# Patient Record
Sex: FEMALE | Race: WHITE | NOT HISPANIC OR LATINO
[De-identification: names, ages, dates, MRNs, and addresses within clinical notes are randomized per-mention and may not be internally consistent; named-entity substitution may affect disease eponyms.]

---

## 2018-05-21 ENCOUNTER — TRANSCRIPTION ENCOUNTER (OUTPATIENT)
Age: 53
End: 2018-05-21

## 2021-05-03 DIAGNOSIS — L23.7 ALLERGIC CONTACT DERMATITIS DUE TO PLANTS, EXCEPT FOOD: ICD-10-CM

## 2021-05-03 DIAGNOSIS — Z86.69 PERSONAL HISTORY OF OTHER DISEASES OF THE NERVOUS SYSTEM AND SENSE ORGANS: ICD-10-CM

## 2021-05-03 DIAGNOSIS — G43.909 MIGRAINE, UNSPECIFIED, NOT INTRACTABLE, W/OUT STATUS MIGRAINOSUS: ICD-10-CM

## 2021-05-03 DIAGNOSIS — Z86.79 PERSONAL HISTORY OF OTHER DISEASES OF THE CIRCULATORY SYSTEM: ICD-10-CM
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
## 2022-11-10 DIAGNOSIS — S43.012D: ICD-10-CM

## 2022-11-10 DIAGNOSIS — Z87.891 PERSONAL HISTORY OF NICOTINE DEPENDENCE: ICD-10-CM

## 2022-11-10 DIAGNOSIS — M25.511 PAIN IN RIGHT SHOULDER: ICD-10-CM

## 2022-11-10 DIAGNOSIS — M75.51 BURSITIS OF RIGHT SHOULDER: ICD-10-CM

## 2022-11-10 RX ORDER — LISINOPRIL 20 MG/1
20 TABLET ORAL DAILY
Refills: 0 | Status: ACTIVE | COMMUNITY

## 2022-11-10 RX ORDER — ELETRIPTAN HYDROBROMIDE 40 MG/1
40 TABLET, FILM COATED ORAL
Refills: 0 | Status: ACTIVE | COMMUNITY

## 2022-11-10 RX ORDER — OXYCODONE HYDROCHLORIDE AND ACETAMINOPHEN 7.5; 325 MG/1; MG/1
7.5-325 TABLET ORAL
Refills: 0 | Status: ACTIVE | COMMUNITY

## 2022-11-10 RX ORDER — MELOXICAM 7.5 MG/1
7.5 TABLET ORAL DAILY
Refills: 0 | Status: ACTIVE | COMMUNITY

## 2022-11-10 RX ORDER — DIVALPROEX SODIUM 500 MG/1
500 TABLET, EXTENDED RELEASE ORAL DAILY
Refills: 0 | Status: ACTIVE | COMMUNITY

## 2022-11-10 RX ORDER — TOPIRAMATE 200 MG/1
200 TABLET, COATED ORAL DAILY
Refills: 0 | Status: ACTIVE | COMMUNITY

## 2022-11-10 RX ORDER — OXYCODONE 9 MG/1
9 CAPSULE, EXTENDED RELEASE ORAL
Refills: 0 | Status: ACTIVE | COMMUNITY

## 2022-11-10 RX ORDER — PROPRANOLOL HYDROCHLORIDE 120 MG/1
120 CAPSULE, EXTENDED RELEASE ORAL DAILY
Refills: 0 | Status: ACTIVE | COMMUNITY

## 2023-01-09 ENCOUNTER — APPOINTMENT (OUTPATIENT)
Dept: ORTHOPEDIC SURGERY | Facility: CLINIC | Age: 58
End: 2023-01-09

## 2023-03-06 ENCOUNTER — APPOINTMENT (OUTPATIENT)
Dept: ORTHOPEDIC SURGERY | Facility: CLINIC | Age: 58
End: 2023-03-06
Payer: OTHER MISCELLANEOUS

## 2023-03-06 VITALS — BODY MASS INDEX: 24.45 KG/M2 | HEIGHT: 64.5 IN | WEIGHT: 145 LBS

## 2023-03-06 PROCEDURE — 99072 ADDL SUPL MATRL&STAF TM PHE: CPT

## 2023-03-06 PROCEDURE — 20610 DRAIN/INJ JOINT/BURSA W/O US: CPT | Mod: RT

## 2023-03-06 PROCEDURE — 99213 OFFICE O/P EST LOW 20 MIN: CPT | Mod: 25

## 2023-03-06 PROCEDURE — J3490M: CUSTOM | Mod: RT

## 2023-03-06 NOTE — HISTORY OF PRESENT ILLNESS
[Work related] : work related [6] : 6 [4] : 4 [Sharp] : sharp [Intermittent] : intermittent [Household chores] : household chores [Leisure] : leisure [Nothing helps with pain getting better] : Nothing helps with pain getting better [Standing] : standing [Walking] : walking [Not working due to injury] : Work status: not working due to injury [] : no [FreeTextEntry1] : right shoulder  [FreeTextEntry5] : Patient is here to follow up on right shoulder. CSI from 10/10/22 gave relief. Pain has not improved since last visit. States pain is still worse with lifting arm above head or prolonged rest. Takes Tzanidine when needed.

## 2023-03-06 NOTE — IMAGING
[de-identified] : Examination of the right upper extremity reveals normal neurovascular exam.  Examination of the right shoulder reveals limited active forward elevation to 140 degrees external rotation of 50 degrees internal rotation to T12.  There is 5 out of 5 strength of deltoid and rotator cuff.  She has markedly positive Neer and Crook impingement signs.  She has no instability or apprehension.  She has well-healed scars.

## 2023-06-05 ENCOUNTER — APPOINTMENT (OUTPATIENT)
Dept: ORTHOPEDIC SURGERY | Facility: CLINIC | Age: 58
End: 2023-06-05
Payer: OTHER MISCELLANEOUS

## 2023-06-05 ENCOUNTER — RESULT CHARGE (OUTPATIENT)
Age: 58
End: 2023-06-05

## 2023-06-05 VITALS — HEIGHT: 64.5 IN | BODY MASS INDEX: 23.78 KG/M2 | WEIGHT: 141 LBS

## 2023-06-05 PROCEDURE — 73030 X-RAY EXAM OF SHOULDER: CPT | Mod: RT

## 2023-06-05 PROCEDURE — 73010 X-RAY EXAM OF SHOULDER BLADE: CPT | Mod: RT

## 2023-06-05 PROCEDURE — 99213 OFFICE O/P EST LOW 20 MIN: CPT | Mod: 25

## 2023-06-05 NOTE — HISTORY OF PRESENT ILLNESS
[Work related] : work related [Sharp] : sharp [Intermittent] : intermittent [Household chores] : household chores [Leisure] : leisure [Nothing helps with pain getting better] : Nothing helps with pain getting better [Standing] : standing [Walking] : walking [Not working due to injury] : Work status: not working due to injury [4] : 4 [3] : 3 [] : no [FreeTextEntry1] : right shoulder  [FreeTextEntry5] : 58 y/o F presents for WCFU eval. of Rt. Shldr. Pt reports pain levels have improved since last visit. Previous tx CSI 3/6/23 with good relief. Occasional radiating pain into neck. [FreeTextEntry7] : neck [de-identified] : 3/6/23 [de-identified] : Dr. Simon [de-identified] : CSI

## 2023-06-05 NOTE — ASSESSMENT
Patient armband removed and shredded  Patient confirmed by two identifiers with discharge instructions prior to being provided to patient and partner, Onelia Farris. [FreeTextEntry1] : Patient comes in today for follow-up on her right shoulder.  She continues have pain and stiffness reaching overhead straight ahead and behind. The patient has had a recent increase in pain without new trauma. She has pain with lifting and repetitive motions. She is not working at this time. She is RHD. \par \par Right shoulder exam: Neurovascularly intact. Sensation intact.  Examination of the right shoulder reveals limited active forward elevation to 140 degrees external rotation of 50 degrees internal rotation to T12.  There is 5 out of 5 strength of deltoid and rotator cuff.  She has markedly positive Neer and Crook impingement signs.  She has no instability or apprehension.  She has well-healed scars.\par \par X-rays done in the office today of the right shoulder 5 views including a weighted view shows no evidence of arthritis loose bodies tumors masses or calcification.  There is no proximal migration.  Normal study.\par \par Under sterile conditions the right shoulder was injected in the subacromial space with 5 cc quarter percent plain Marcaine 5 cc of 2% plain lidocaine and 40 mg of Kenalog.  Patient tolerated the procedure well.\par \par Plan at this point is activities as tolerated.  She is 100% temporary disability to the right shoulder.  Right shoulder pain is causally related to her work accident.  She is currently not working.  I will see her back in the office in 3 months as needed.

## 2023-06-19 ENCOUNTER — TRANSCRIPTION ENCOUNTER (OUTPATIENT)
Age: 58
End: 2023-06-19

## 2023-09-11 ENCOUNTER — APPOINTMENT (OUTPATIENT)
Dept: ORTHOPEDIC SURGERY | Facility: CLINIC | Age: 58
End: 2023-09-11
Payer: OTHER MISCELLANEOUS

## 2023-09-11 VITALS — WEIGHT: 140 LBS | BODY MASS INDEX: 23.9 KG/M2 | HEIGHT: 64 IN

## 2023-09-11 PROCEDURE — 99213 OFFICE O/P EST LOW 20 MIN: CPT | Mod: 25

## 2023-09-11 PROCEDURE — 20610 DRAIN/INJ JOINT/BURSA W/O US: CPT | Mod: RT

## 2023-09-11 PROCEDURE — J3490M: CUSTOM

## 2023-12-22 ENCOUNTER — APPOINTMENT (OUTPATIENT)
Dept: ORTHOPEDIC SURGERY | Facility: CLINIC | Age: 58
End: 2023-12-22
Payer: OTHER MISCELLANEOUS

## 2023-12-22 VITALS — WEIGHT: 140 LBS | HEIGHT: 64 IN | BODY MASS INDEX: 23.9 KG/M2

## 2023-12-22 PROCEDURE — J3490M: CUSTOM

## 2023-12-22 PROCEDURE — 99213 OFFICE O/P EST LOW 20 MIN: CPT | Mod: 25

## 2023-12-22 PROCEDURE — 20610 DRAIN/INJ JOINT/BURSA W/O US: CPT | Mod: RT

## 2023-12-22 NOTE — ASSESSMENT
[FreeTextEntry1] : The patient comes in today follow-up on her right shoulder.  She continues to have pain consistent with her chronic impingement.  The last cortisone injection worked well for about 2 months.  She still has pain reaching overhead, straight ahead and behind.  She gets occasional night pain.  She is currently not working.  Examination of right upper extremity reveals normal neurovascular exam.  Her scars are well-healed.  She has slight limitation of range of motion with forward elevation to 140, external rotation to 60 and internal rotation to T10.  There is positive Neer and Crook impingement signs.  There is 5 out of 5 strength of deltoid and rotator cuff.  There is no pain or deformity over the AC joint.  There is no instability or apprehension.  Under sterile conditions the right shoulder was injected in the subacromial space with 5 cc quarter percent plain Marcaine 5 cc of 2% plain lidocaine and 40 mg of Kenalog.  Patient tolerated the procedure well.  Plan at this point is ice and activity modification.  She has 100% total disability to the right shoulder.  Right shoulder pain is causally related to her work accident.  She is currently not working.  I will see her back in 3 months.

## 2023-12-22 NOTE — HISTORY OF PRESENT ILLNESS
[Work related] : work related [5] : 5 [Dull/Aching] : dull/aching [Not working due to injury] : Work status: not working due to injury [Steroid] : Steroid [] : Post Surgical Visit: no [FreeTextEntry1] : right shoulder [FreeTextEntry3] : 10/16/2008 [FreeTextEntry5] : pt is here for follow up, states pain is the same since last time. 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

## 2024-06-03 ENCOUNTER — APPOINTMENT (OUTPATIENT)
Dept: ORTHOPEDIC SURGERY | Facility: CLINIC | Age: 59
End: 2024-06-03
Payer: OTHER MISCELLANEOUS

## 2024-06-03 VITALS — WEIGHT: 145 LBS | HEIGHT: 64.5 IN | BODY MASS INDEX: 24.45 KG/M2

## 2024-06-03 DIAGNOSIS — M19.111 POST-TRAUMATIC OSTEOARTHRITIS, RIGHT SHOULDER: ICD-10-CM

## 2024-06-03 DIAGNOSIS — M75.41 IMPINGEMENT SYNDROME OF RIGHT SHOULDER: ICD-10-CM

## 2024-06-03 PROCEDURE — 99213 OFFICE O/P EST LOW 20 MIN: CPT | Mod: 25

## 2024-06-03 PROCEDURE — 20610 DRAIN/INJ JOINT/BURSA W/O US: CPT | Mod: RT

## 2024-06-03 PROCEDURE — J3490M: CUSTOM

## 2024-06-04 PROBLEM — M75.41 IMPINGEMENT SYNDROME OF RIGHT SHOULDER: Status: ACTIVE | Noted: 2022-11-10

## 2024-06-04 NOTE — ASSESSMENT
[FreeTextEntry1] : The patient comes in today follow-up on her right shoulder pain. She denies new trauma but has increased pain from ADLs.  She continues to have pain consistent with her chronic impingement.  The last cortisone injection worked well for about 2 months.  She still has pain reaching overhead, straight ahead and behind.  She gets occasional night pain.  She is currently not working. She wishes to consider another injection today.  Right shoulder exam: Neurovascularly intact. Sensation intact.  Her scars are well-healed.  She has slight limitation of range of motion with forward elevation to 140, external rotation to 60 and internal rotation to T10.  There is positive Neer and Crook impingement signs.  There is 5 out of 5 strength of deltoid and rotator cuff.  There is no pain or deformity over the AC joint.  There is no instability or apprehension. Under sterile conditions the right shoulder was injected in the subacromial space with 5 cc quarter percent plain Marcaine 5 cc of 2% plain lidocaine and 40 mg of Kenalog.  Patient tolerated the procedure well.  Plan at this time is ice and activity modification.  She has 100% total disability to the right shoulder.  Her right shoulder pain is causally related to her work accident.  She is currently not working.  I will see her back in the office in 3 months as needed.

## 2024-06-04 NOTE — HISTORY OF PRESENT ILLNESS
[Work related] : work related [Dull/Aching] : dull/aching [Not working due to injury] : Work status: not working due to injury [Steroid] : Steroid [3] : 3 [Injection therapy] : injection therapy [] : Post Surgical Visit: no [FreeTextEntry3] : 10/16/2008 [FreeTextEntry5] : 59 y/o F presents for WCFU eval of the Rt. shldr today. Pt notes of improved pain levels. Previous tx CSI with some relief. Limited ROM.   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

## 2024-06-18 ENCOUNTER — APPOINTMENT (OUTPATIENT)
Dept: ORTHOPEDIC SURGERY | Facility: CLINIC | Age: 59
End: 2024-06-18
Payer: COMMERCIAL

## 2024-06-18 VITALS — HEIGHT: 64.5 IN | WEIGHT: 145 LBS | BODY MASS INDEX: 24.45 KG/M2

## 2024-06-18 DIAGNOSIS — M94.262 CHONDROMALACIA, LEFT KNEE: ICD-10-CM

## 2024-06-18 PROCEDURE — 99204 OFFICE O/P NEW MOD 45 MIN: CPT | Mod: 25

## 2024-06-18 PROCEDURE — J3490M: CUSTOM

## 2024-06-18 PROCEDURE — 73564 X-RAY EXAM KNEE 4 OR MORE: CPT | Mod: LT

## 2024-06-18 PROCEDURE — 20610 DRAIN/INJ JOINT/BURSA W/O US: CPT | Mod: LT

## 2024-06-18 NOTE — PROCEDURE
[FreeTextEntry1] : L knee CSI [FreeTextEntry2] : pain [FreeTextEntry3] : Risks and benefits of the injection were discussed with the patient including infection, bleeding, allergic reactions, worsening of symptoms, and possible neurovascular damage.  They stated understanding and were agreeable to proceed.  An inferolateral portal was marked at the junction of the inferior and lateral borders of the patella and the L knee was sterilely prepped with betadine and ETOH.  The inferolateral portal was then used to inject 40 mg of Kenalog and 4 cc of Marcaine.  The injection site was cleaned with ETOH and a sterile band aid was applied.  Patient tolerated the procedure well without any apparent complications.  Counseled on concerning signs/symptoms after injection and to call office should any problems arise.

## 2024-06-18 NOTE — DISCUSSION/SUMMARY
[de-identified] : Clarice has nonspecific anterior knee pain.  She has minimal effusion.  She does have some mild joint line tenderness.  She does state that she has had a previous MRI that showed chondromalacia of the knee with no other abnormalities.  Her x-rays look fine today and is minimal arthritis.  We gave her steroid injection and encouraged her to use ibuprofen as needed for the pain.  She does see pain management for her back and takes Percocet and occasionally an additional Tylenol as well.  We discussed gel injections today and if she is not improved in 6 to 8 weeks she will return for these.  I gave her prescription for physical therapy today.  All of her questions were answered she is in agreement with this plan.

## 2024-06-18 NOTE — HISTORY OF PRESENT ILLNESS
[7] : 7 [de-identified] : 6/18/24 CHARLIE GARCIA  is a 58 year F here today for left knee pain.  Pain has been present for a few years and is located global left knee.      Injury: yes, years ago  Instability: yes  Clicking/popping: yes  Does knee lock up: yes Swelling/effusions: yes Exacerbating activities/motions: yes  Previous treatment: Surgery: yes a few NSAIDs: no PT: no Injections: no Brace: no Activity mods: no   Occupation: out of work currently Recreational Activities: n/a [FreeTextEntry5] : 58 y.o patient is here for left knee pain today. States she injured it when she was 10 years old and had a couple surgeries since then. States she is concerned that it is not getting better.

## 2024-06-18 NOTE — IMAGING
[de-identified] : Gait: Non-antalgic Alignment: Neutral Scars: None   L Knee: Tenderness: None ROM: 0-120 degrees Crepitus: None Effusion: None Warmth: None   Meniscal Signs: Pain on terminal extension: None Pain on terminal flexion: None McMurrays: Neg Thessaly's: Neg   Ligament Exam: Lachman: neg Post Drawer: neg Valgus stress: neg at 0 and 30 degrees Varus stress: neg at 0 and 30 degrees Pivot shift: neg Dial test: neg at 30 degrees, neg at 90 degrees   Strength: Quads: 5/5 Hamstrin/5 DF/PF/EHL 5/5   Sensation grossly intact in all dermatomes, DP/PT 2+, brisk capillary refill distally [Left] : left knee [All Views] : anteroposterior, lateral, skyline, and anteroposterior standing [There are no fractures, subluxations or dislocations. No significant abnormalities are seen] : There are no fractures, subluxations or dislocations. No significant abnormalities are seen

## 2024-09-23 ENCOUNTER — APPOINTMENT (OUTPATIENT)
Dept: ORTHOPEDIC SURGERY | Facility: CLINIC | Age: 59
End: 2024-09-23
Payer: OTHER MISCELLANEOUS

## 2024-09-23 VITALS — WEIGHT: 145 LBS | BODY MASS INDEX: 24.45 KG/M2 | HEIGHT: 64.5 IN

## 2024-09-23 DIAGNOSIS — M75.41 IMPINGEMENT SYNDROME OF RIGHT SHOULDER: ICD-10-CM

## 2024-09-23 PROCEDURE — 20610 DRAIN/INJ JOINT/BURSA W/O US: CPT | Mod: RT

## 2024-09-23 PROCEDURE — 99213 OFFICE O/P EST LOW 20 MIN: CPT | Mod: 25

## 2024-09-23 PROCEDURE — J3490M: CUSTOM

## 2024-09-23 RX ORDER — TIZANIDINE HYDROCHLORIDE 6 MG/1
CAPSULE ORAL
Refills: 0 | Status: ACTIVE | COMMUNITY

## 2024-09-24 ENCOUNTER — APPOINTMENT (OUTPATIENT)
Dept: ORTHOPEDIC SURGERY | Facility: CLINIC | Age: 59
End: 2024-09-24
Payer: COMMERCIAL

## 2024-09-24 VITALS — BODY MASS INDEX: 24.75 KG/M2 | WEIGHT: 145 LBS | HEIGHT: 64 IN

## 2024-09-24 DIAGNOSIS — M94.262 CHONDROMALACIA, LEFT KNEE: ICD-10-CM

## 2024-09-24 PROCEDURE — J3490M: CUSTOM

## 2024-09-24 PROCEDURE — 20610 DRAIN/INJ JOINT/BURSA W/O US: CPT | Mod: LT

## 2024-09-24 PROCEDURE — 99213 OFFICE O/P EST LOW 20 MIN: CPT | Mod: 25

## 2024-09-24 NOTE — DISCUSSION/SUMMARY
[de-identified] : Clarice got good relief from previous CSI and requested another today.  We repeated her steroid injection and encouraged her to use ibuprofen as needed for the pain.  She does see pain management for her back and takes Percocet and occasionally an additional Tylenol as well.  We discussed gel injections today and she will return when her knee starts hurting again to get approval for these.  All of her questions were answered she is in agreement with this plan.

## 2024-09-24 NOTE — IMAGING
[Left] : left knee [All Views] : anteroposterior, lateral, skyline, and anteroposterior standing [There are no fractures, subluxations or dislocations. No significant abnormalities are seen] : There are no fractures, subluxations or dislocations. No significant abnormalities are seen [de-identified] : Gait: Non-antalgic Alignment: Neutral Scars: None   L Knee: Tenderness: None ROM: 0-120 degrees Crepitus: None Effusion: None Warmth: None   Meniscal Signs: Pain on terminal extension: None Pain on terminal flexion: None McMurrays: Neg Thessaly's: Neg   Ligament Exam: Lachman: neg Post Drawer: neg Valgus stress: neg at 0 and 30 degrees Varus stress: neg at 0 and 30 degrees Pivot shift: neg Dial test: neg at 30 degrees, neg at 90 degrees   Strength: Quads: 5/5 Hamstrin/5 DF/PF/EHL 5/5   Sensation grossly intact in all dermatomes, DP/PT 2+, brisk capillary refill distally

## 2024-09-24 NOTE — HISTORY OF PRESENT ILLNESS
[Work related] : work related [3] : 3 [Dull/Aching] : dull/aching [Injection therapy] : injection therapy [Not working due to injury] : Work status: not working due to injury [] : yes [Steroid] : Steroid [FreeTextEntry3] : 10/16/2008 [FreeTextEntry5] : 58 y/o F presents for WCFU eval of the Rt. shldr today. Previous tx CSI with good relief. Notes clicking in shldr has returned.  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

## 2024-09-24 NOTE — HISTORY OF PRESENT ILLNESS
[Work related] : work related [3] : 3 [Dull/Aching] : dull/aching [Injection therapy] : injection therapy [Not working due to injury] : Work status: not working due to injury [] : yes [Steroid] : Steroid [FreeTextEntry3] : 10/16/2008 [FreeTextEntry5] : 60 y/o F presents for WCFU eval of the Rt. shldr today. Previous tx CSI with good relief. Notes clicking in shldr has returned.  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

## 2024-09-24 NOTE — ASSESSMENT
[FreeTextEntry1] : The patient comes in today for follow-up on her right shoulder.  She continues to do well with the cortisone injections, however, the only lasted a short of 3 months.  She is currently not working.  She has pain at rest and at night.  She has difficulty putting her arm overhead with any strength.  She has pain overhead and behind and reaching awkwardly.  Examination of the right upper extremity reveals normal neurovascular exam.  Examination of the right shoulder feels well-healed scars.  She has active forward elevation of 150 and passively to 160.  Her external rotation is to 60 and her internal rotation is to T10.  There is 5/5 strength of deltoid and rotator cuff.  There is positive Neer and Crook impingement signs.  There is no instability or apprehension.  There is no pain or deformity over the AC joint.  Under sterile conditions the right shoulder was injected in the subacromial space with 5 cc quarter percent plain Marcaine 5 cc of 2% plain lidocaine and 40 mg of Kenalog.  Patient tolerated the procedure well.  Plan at this time is activities as tolerated.  She remains totally disabled at 100%.  Her right shoulder pain is causally related to her work accident.  I will see her back in the office in 3 months.  She will not be returning to work.

## 2024-09-24 NOTE — HISTORY OF PRESENT ILLNESS
[7] : 7 [de-identified] : 6/18/24 CHARLIE GARCIA  is a 58 year F here today for left knee pain.  Pain has been present for a few years and is located global left knee.      Injury: yes, years ago  Instability: yes  Clicking/popping: yes  Does knee lock up: yes Swelling/effusions: yes Exacerbating activities/motions: yes  Previous treatment: Surgery: yes a few NSAIDs: no PT: no Injections: no Brace: no Activity mods: no   Occupation: out of work currently Recreational Activities: n/a  9/24/24 pt is here for fu of her left knee today. States pain levels and swelling increased. [FreeTextEntry5] : 58 y.o patient is here for left knee pain today. States she injured it when she was 10 years old and had a couple surgeries since then. States she is concerned that it is not getting better.

## 2025-01-06 ENCOUNTER — APPOINTMENT (OUTPATIENT)
Facility: CLINIC | Age: 60
End: 2025-01-06
Payer: OTHER MISCELLANEOUS

## 2025-01-06 DIAGNOSIS — M75.41 IMPINGEMENT SYNDROME OF RIGHT SHOULDER: ICD-10-CM

## 2025-01-06 PROCEDURE — 20610 DRAIN/INJ JOINT/BURSA W/O US: CPT | Mod: RT

## 2025-01-06 PROCEDURE — J3490M: CUSTOM

## 2025-01-06 PROCEDURE — 99213 OFFICE O/P EST LOW 20 MIN: CPT | Mod: 25

## 2025-04-14 ENCOUNTER — APPOINTMENT (OUTPATIENT)
Facility: CLINIC | Age: 60
End: 2025-04-14
Payer: OTHER MISCELLANEOUS

## 2025-04-14 VITALS — WEIGHT: 140 LBS | BODY MASS INDEX: 23.9 KG/M2 | HEIGHT: 64 IN

## 2025-04-14 DIAGNOSIS — M19.111 POST-TRAUMATIC OSTEOARTHRITIS, RIGHT SHOULDER: ICD-10-CM

## 2025-04-14 DIAGNOSIS — M75.41 IMPINGEMENT SYNDROME OF RIGHT SHOULDER: ICD-10-CM

## 2025-04-14 PROCEDURE — 99213 OFFICE O/P EST LOW 20 MIN: CPT | Mod: 25

## 2025-04-14 PROCEDURE — 20610 DRAIN/INJ JOINT/BURSA W/O US: CPT | Mod: RT

## 2025-04-14 PROCEDURE — J3490M: CUSTOM

## 2025-07-21 ENCOUNTER — APPOINTMENT (OUTPATIENT)
Facility: CLINIC | Age: 60
End: 2025-07-21
Payer: OTHER MISCELLANEOUS

## 2025-07-21 VITALS — HEIGHT: 64 IN | WEIGHT: 140 LBS | BODY MASS INDEX: 23.9 KG/M2

## 2025-07-21 DIAGNOSIS — M19.111 POST-TRAUMATIC OSTEOARTHRITIS, RIGHT SHOULDER: ICD-10-CM

## 2025-07-21 DIAGNOSIS — M75.41 IMPINGEMENT SYNDROME OF RIGHT SHOULDER: ICD-10-CM

## 2025-07-21 PROCEDURE — 99213 OFFICE O/P EST LOW 20 MIN: CPT
